# Patient Record
Sex: MALE | Race: WHITE
[De-identification: names, ages, dates, MRNs, and addresses within clinical notes are randomized per-mention and may not be internally consistent; named-entity substitution may affect disease eponyms.]

---

## 2017-04-07 ENCOUNTER — HOSPITAL ENCOUNTER (OUTPATIENT)
Dept: HOSPITAL 45 - C.LAB1850 | Age: 52
Discharge: HOME | End: 2017-04-07
Attending: FAMILY MEDICINE
Payer: COMMERCIAL

## 2017-04-07 DIAGNOSIS — E55.9: ICD-10-CM

## 2017-04-07 DIAGNOSIS — R73.01: Primary | ICD-10-CM

## 2017-04-07 LAB — EST. AVERAGE GLUCOSE BLD GHB EST-MCNC: 120 MG/DL

## 2017-07-09 ENCOUNTER — HOSPITAL ENCOUNTER (EMERGENCY)
Dept: HOSPITAL 45 - C.EDB | Age: 52
Discharge: HOME | End: 2017-07-09
Payer: COMMERCIAL

## 2017-07-09 VITALS
BODY MASS INDEX: 42.66 KG/M2 | WEIGHT: 315 LBS | HEIGHT: 72.01 IN | HEIGHT: 72.01 IN | BODY MASS INDEX: 42.66 KG/M2 | WEIGHT: 315 LBS

## 2017-07-09 VITALS — SYSTOLIC BLOOD PRESSURE: 148 MMHG | HEART RATE: 78 BPM | OXYGEN SATURATION: 97 % | DIASTOLIC BLOOD PRESSURE: 82 MMHG

## 2017-07-09 VITALS — TEMPERATURE: 98.06 F

## 2017-07-09 DIAGNOSIS — E78.5: ICD-10-CM

## 2017-07-09 DIAGNOSIS — G47.33: ICD-10-CM

## 2017-07-09 DIAGNOSIS — E66.01: ICD-10-CM

## 2017-07-09 DIAGNOSIS — K21.9: ICD-10-CM

## 2017-07-09 DIAGNOSIS — L29.9: Primary | ICD-10-CM

## 2017-07-09 NOTE — EMERGENCY ROOM VISIT NOTE
ED Visit Note


First contact with patient:  17:29


CHIEF COMPLAINT: Itchy skin rash since yesterday





HISTORY OF PRESENT ILLNESS:  Patient is a 51-year-old white male who presents 

to the emergency department for evaluation of an itchy skin rash on his legs 

that he noticed yesterday.  His granddaughter was getting , and he could 

not do anything about it yesterday.  He notes a few small, red, slightly raised 

lesions on his legs.  He reports that they're very itchy.  He applied calamine 

lotion to the area.  It has not helped.  He denies any lesions elsewhere.  The 

patient has been outside in the yard or the woods recently where there could 

have been exposure to poison ivy plants.  





REVIEW OF SYSTEMS: Review of systems as per HPI.  All other systems reviewed 

were negative.  At least 6 systems reviewed.





PMH:  Electronic medical records are reviewed and summarized as above/below.  

See Problem List.





SOCIAL HISTORY:  Patient lives at home with his wife.    





PHYSICAL EXAM: Vital Signs: See nurses' notes.  CONSTITUTIONAL: Patient is an 

obese 51-year-old white female awake and alert and in no acute distress.  

INTEGUMENTARY: The patient has a few small scattered lesions noted on his legs.

  They are slightly red, raised and some appear vesicular in nature, others are 

excoriated over.  He has a lesion on the thighs bilaterally, and behind the 

left knee.  


   


ED course: Patient was medicated with Decadron 10 mg and Vistaril 50 mg IM.  

Lesions appear consistent with a contact dermatitis, possibly a rhus 

dermatitis.  Differential diagnoses also entertained included urticaria, 

folliculitis, scabies, among others.  The patient will be placed on a course of 

oral prednisone.  He was advised to follow-up with his primary care provider if 

his symptoms are not improving.


Problem List


Medical Problems:


(1) Esophageal Reflux


Status: Chronic  





(2) Hand laceration


Status: Resolved  





(3) Hand laceration


Status: Resolved  





(4) Hyperlipidemia Nec/Nos


Status: Chronic  





(5) Knee sprain


Status: Resolved  





(6) Morbid Obesity


Status: Chronic  





(7) Obstructive Sleep Apnea (Adult) (Pediatric)


Status: Chronic  





(8) Patellar dislocation


Status: Resolved  





(9) Work related injury


Status: Resolved  





Surgical Problems:


(1) H/O resection of large bowel


Status: Resolved  











Current/Historical Medications


Scheduled


Ergocalciferol (Vitamin D 68816 Unit), 50,000 UNIT PO Q2D


Pantoprazole (Protonix), 40 MG PO QAM


Prednisone (Prednisone), 0 PO DAILY





Scheduled PRN


Hydroxyzine Pamoate (Vistaril), 1-2 CAP PO Q6 PRN for Itching





Allergies


Coded Allergies:  


     Latex2 -Systemic Allergic Response (Verified  Allergy, Severe, HIVES, 7/9/ 17)


     Atorvastatin (Verified  Allergy, Intermediate, "FELT LIKE LUMP IN STOMACH-

PAINFUL", 7/9/17)





Vital Signs











  Date Time  Temp Pulse Resp B/P (MAP) Pulse Ox O2 Delivery O2 Flow Rate FiO2


 


7/9/17 18:32  78 20 148/82 97   


 


7/9/17 17:01 36.7 81 18 151/83 94 Room Air  











Medications Administered











 Medications


  (Trade)  Dose


 Ordered  Sig/Rock


 Route  Start Time


 Stop Time Status Last Admin


Dose Admin


 


 Dexamethasone


 Sodium Phosphate


  (Decadron Inj)  10 mg  NOW  ONCE


 IM  7/9/17 18:00


 7/9/17 18:01 DC 7/9/17 18:24


10 MG


 


 Hydroxyzine HCl


  (Vistaril IM)  50 mg  NOW  STAT


 IM  7/9/17 17:57


 7/9/17 17:58 DC 7/9/17 18:24


50 MG











Departure Information


Impression





 Primary Impression:  


 Pruritic rash





Prescriptions





Hydroxyzine Pamoate (VISTARIL) 25 Mg Cap


1-2 CAP PO Q6 Y for Itching, #30 CAP


   Prov: Monica Delatorre PA         7/9/17 


Prednisone (Prednisone) 20 Mg Tab


0 PO DAILY, #30 TAB


   3 DAILY FOR 5 DAYS, THEN 2 DAILY FOR 5 DAYS, THEN 1 DAILY FOR 5 DAYS.


   Prov: Monica Delatorre PA         7/9/17





Referrals


Matias Ogden M.D. (PCP)





Patient Instructions


My [facility]





Additional Instructions





DO NOT drive, drink alcohol, operate machinery, or perform dangerous activities 

today.  You were given medications in the ER that can affect your ability to 

safely function or operate a vehicle.





Prednisone 20mg:  Once daily as instructed until the prescription is finished.  

It is best to take this earlier in the day as some patients note occasional 

difficulty falling asleep when taken in the late evening.





Hydroxyzine 25mg:  use 25 to 50 mg as needed every six hours for swelling, 

itching, or hives.  This medication is sedating and will cause drowsiness. 

Avoid alcohol, operating machinery or dangerous equipment, working on ladders 

or roofs, DRIVING, or situations where being under the influence may be 

dangerous.





Zantac 75: Take two pills twice a day along with Benadryl as needed for swelling

, itching, or hives. Most people know this for its affect on the stomach, but 

it also acts similar to, but less potent than Benadryl for allergic reactions.  





Zantac is available over-the-counter.





Continue current medications.





Return to the emergency department for worsening of your rash, swelling of your 

face, lips, tongue, or throat, difficulty breathing, vomiting, or as needed.


 


Follow-up with your primary care physician in 2-3 days for a recheck of your 

current condition.

## 2017-08-25 ENCOUNTER — HOSPITAL ENCOUNTER (OUTPATIENT)
Dept: HOSPITAL 45 - C.LAB1850 | Age: 52
Discharge: HOME | End: 2017-08-25
Attending: FAMILY MEDICINE
Payer: COMMERCIAL

## 2017-08-25 DIAGNOSIS — E55.9: ICD-10-CM

## 2017-08-25 DIAGNOSIS — R73.03: Primary | ICD-10-CM

## 2017-08-25 LAB
ALBUMIN/GLOB SERPL: 0.9 {RATIO} (ref 0.9–2)
ALP SERPL-CCNC: 90 U/L (ref 45–117)
ALT SERPL-CCNC: 38 U/L (ref 12–78)
ANION GAP SERPL CALC-SCNC: 9 MMOL/L (ref 3–11)
AST SERPL-CCNC: 20 U/L (ref 15–37)
BUN SERPL-MCNC: 15 MG/DL (ref 7–18)
BUN/CREAT SERPL: 15.4 (ref 10–20)
CALCIUM SERPL-MCNC: 9.1 MG/DL (ref 8.5–10.1)
CHLORIDE SERPL-SCNC: 104 MMOL/L (ref 98–107)
CHOLEST/HDLC SERPL: 4.8 {RATIO}
CO2 SERPL-SCNC: 25 MMOL/L (ref 21–32)
CREAT SERPL-MCNC: 0.97 MG/DL (ref 0.6–1.4)
GLOBULIN SER-MCNC: 4.2 GM/DL (ref 2.5–4)
GLUCOSE SERPL-MCNC: 79 MG/DL (ref 70–99)
GLUCOSE UR QL: 34 MG/DL
KETONES UR QL STRIP: 104 MG/DL
NITRITE UR QL STRIP: 128 MG/DL (ref 0–150)
PH UR: 164 MG/DL (ref 0–200)
POTASSIUM SERPL-SCNC: 3.6 MMOL/L (ref 3.5–5.1)
SODIUM SERPL-SCNC: 138 MMOL/L (ref 136–145)
TSH SERPL-ACNC: 0.96 UIU/ML (ref 0.3–4.5)
VERY LOW DENSITY LIPOPROT CALC: 26 MG/DL

## 2017-08-26 LAB — EST. AVERAGE GLUCOSE BLD GHB EST-MCNC: 105 MG/DL

## 2017-10-22 ENCOUNTER — HOSPITAL ENCOUNTER (EMERGENCY)
Dept: HOSPITAL 45 - C.EDB | Age: 52
Discharge: HOME | End: 2017-10-22
Payer: COMMERCIAL

## 2017-10-22 VITALS — OXYGEN SATURATION: 95 % | SYSTOLIC BLOOD PRESSURE: 127 MMHG | HEART RATE: 72 BPM | DIASTOLIC BLOOD PRESSURE: 57 MMHG

## 2017-10-22 VITALS
HEIGHT: 72.01 IN | BODY MASS INDEX: 42.66 KG/M2 | HEIGHT: 72.01 IN | WEIGHT: 315 LBS | BODY MASS INDEX: 42.66 KG/M2 | WEIGHT: 315 LBS

## 2017-10-22 VITALS — TEMPERATURE: 97.88 F

## 2017-10-22 DIAGNOSIS — J20.9: Primary | ICD-10-CM

## 2017-10-22 DIAGNOSIS — Z79.899: ICD-10-CM

## 2017-10-22 DIAGNOSIS — Z87.01: ICD-10-CM

## 2017-10-22 DIAGNOSIS — F17.220: ICD-10-CM

## 2017-10-22 DIAGNOSIS — R73.03: ICD-10-CM

## 2017-10-22 DIAGNOSIS — Z90.49: ICD-10-CM

## 2017-10-22 LAB
ALBUMIN/GLOB SERPL: 0.8 {RATIO} (ref 0.9–2)
ALP SERPL-CCNC: 94 U/L (ref 45–117)
ALT SERPL-CCNC: 35 U/L (ref 12–78)
ANION GAP SERPL CALC-SCNC: 5 MMOL/L (ref 3–11)
AST SERPL-CCNC: 19 U/L (ref 15–37)
BASOPHILS # BLD: 0.02 K/UL (ref 0–0.2)
BASOPHILS NFR BLD: 0.2 %
BUN SERPL-MCNC: 16 MG/DL (ref 7–18)
BUN/CREAT SERPL: 17.1 (ref 10–20)
CALCIUM SERPL-MCNC: 8.8 MG/DL (ref 8.5–10.1)
CHLORIDE SERPL-SCNC: 106 MMOL/L (ref 98–107)
CO2 SERPL-SCNC: 27 MMOL/L (ref 21–32)
COMPLETE: YES
CREAT CL PREDICTED SERPL C-G-VRATE: 140.6 ML/MIN
CREAT SERPL-MCNC: 0.93 MG/DL (ref 0.6–1.4)
EOSINOPHIL NFR BLD AUTO: 267 K/UL (ref 130–400)
GLOBULIN SER-MCNC: 4.1 GM/DL (ref 2.5–4)
GLUCOSE SERPL-MCNC: 104 MG/DL (ref 70–99)
HCT VFR BLD CALC: 41.6 % (ref 42–52)
IG%: 0.7 %
IMM GRANULOCYTES NFR BLD AUTO: 20 %
LYMPHOCYTES # BLD: 1.75 K/UL (ref 1.2–3.4)
MCH RBC QN AUTO: 29.1 PG (ref 25–34)
MCHC RBC AUTO-ENTMCNC: 34.4 G/DL (ref 32–36)
MCV RBC AUTO: 84.7 FL (ref 80–100)
MONOCYTES NFR BLD: 8.2 %
NEUTROPHILS # BLD AUTO: 2.4 %
NEUTROPHILS NFR BLD AUTO: 68.5 %
PMV BLD AUTO: 9.6 FL (ref 7.4–10.4)
POTASSIUM SERPL-SCNC: 3.8 MMOL/L (ref 3.5–5.1)
RBC # BLD AUTO: 4.91 M/UL (ref 4.7–6.1)
SODIUM SERPL-SCNC: 138 MMOL/L (ref 136–145)
WBC # BLD AUTO: 8.76 K/UL (ref 4.8–10.8)

## 2017-10-22 NOTE — DIAGNOSTIC IMAGING REPORT
CHEST 2 VIEWS ROUTINE



CLINICAL HISTORY: Respiratory distress    



COMPARISON STUDY:  3/6/2015



FINDINGS: The heart is at the upper limits of normal in size. There is no

failure. There is no focal pulmonary consolidation. There are no pleural

effusions.[ 



IMPRESSION: No active disease in the chest.







Electronically signed by:  Christopher Beckham M.D.

10/22/2017 1:39 PM



Dictated Date/Time:  10/22/2017 1:38 PM

## 2017-10-23 NOTE — EMERGENCY ROOM VISIT NOTE
ED Visit Note


First contact with patient:  11:50


Chief Complaint: I think have a chest cold.





History of Present Illness: Mr. Saldivar is a 52-year-old white male who 

ambulates into the ED accompanied by his wife complaining of cough and, rib 

pain.


Historically patient does report he has had a history of bronchitis and 

pneumonia.


Patient reports her symptoms started approximately 2 weeks ago with a mild 

nonproductive cough.  During the last 2 weeks he reports he has gradually 

increasing cough.  This cough remains nonproductive.  He is tried over-the-

counter cough medications without relief.  He reports last 2 days his cough has 

become so severe that he cannot lie down and he has lost sleep.


Associated with this cough he reports he has having bilateral lateral rib pain.

  He describes this pain as a constant dull achy sensation that becomes sharp 

with cough.  He rates his discomfort 7/10.  His pain is nonradiating.  He has 

not identified any other aggravating factors related to the pain.  He has not 

taken any medications for his discomfort prior to arrival at the hospital.  He 

denies any other associated symptoms with his cough and pain including fevers, 

chills, sweats, skin eruptions, skin color changes, previous clots, claudication

, palpitations, shortness of breath, wheezing, anterior chest pain, recent 

surgery, nausea, vomiting, orthopnea, dependent edema.





Review of Systems: As noted above in history of present illness.  All body 

systems were reviewed and found to be negative as noted above.





Past Medical History: Prediabetes, status post hernia repair, unspecified knee 

surgery and partial colectomy.


Current Medications: Protonix, vitamin D.


Allergies to Medications: Latex, atorvastatin.


Social History: Patient is currently employed; he lives with his wife and feels 

safe in his home environment; he admits to chewing tobacco and denies alcohol 

use.





Physical Examination:


Vital Signs: 








  Date Time  Temp Pulse Resp B/P (MAP) Pulse Ox O2 Delivery O2 Flow Rate FiO2


 


10/22/17 13:16  72 18 127/57 95 Room Air  


 


10/22/17 12:56  72      


 


10/22/17 12:38     95 Room Air  


 


10/22/17 11:49     96 Room Air  


 


10/22/17 11:45 36.6 78 20 130/75 97 Room Air  





GENERAL: 52-year-old male in mild distress due to symptoms, nontoxic-appearing, 

afebrile and hemodynamically stable.


NEUROLOGICAL: Awake, alert and oriented to person, place and time.  Answering 

questions appropriately and following commands.  Normal gait.  Good hand eye 

coordination.  No focal motor sensory deficits.


SKIN: Warm, dry and pink.  No soft tissue eruptions or trauma noted.


HEENT:  Atraumatic and normocephalic.  PERRLA.  Sclera white and conjunctiva 

pink.  No drainage from naris.  Oral cavity moist and pink.  Pharynx is 

nonerythematous or edematous.  Speech normal.  No lymphadenopathy.  Trachea 

midline.  No jugular venous distention.


BACK:  No tenderness over the bony spine.  No CVA tenderness.  


THORAX:  Lungs sounds show a few coarse rhonchi in the right base that clears 

with cough.  There is a decrease in air movement bilaterally primarily in the 

bases.  No wheezing, rales.  Mild tenderness with chest compression without 

crepitus, subcutaneous air or deformities noted.  No increased respiratory 

effort or rate.


HEART:  Regular rate and rhythm.  No gallops, rubs or murmurs are appreciated.


ABDOMEN: Obese, soft and nontender.  Positive bowel sounds in all quadrants.  

No guarding, rigidity or organomegaly.


EXTREMITIES:  Moves all extremities well on command and with purpose.  All 

distal neurovascular statuses are intact and equal bilaterally.  No calf 

tenderness or cords.





ED Course:


Patient is assessed as noted above.


Patient's medication list was reviewed.


Laboratory Testing:








Test


  10/22/17


12:50 10/22/17


13:05 Range/Units


 


 


White Blood Count 8.76  4.8-10.8  K/uL


 


Red Blood Count 4.91  4.7-6.1  M/uL


 


Hemoglobin 14.3  14.0-18.0  g/dL


 


Hematocrit 41.6  42-52  %


 


Mean Corpuscular Volume 84.7    fL


 


Mean Corpuscular Hemoglobin 29.1  25-34  pg


 


Mean Corpuscular Hemoglobin


Concent 34.4


  


  32-36  g/dl


 


 


Platelet Count 267  130-400  K/uL


 


Mean Platelet Volume 9.6  7.4-10.4  fL


 


Neutrophils (%) (Auto) 68.5   %


 


Lymphocytes (%) (Auto) 20.0   %


 


Monocytes (%) (Auto) 8.2   %


 


Eosinophils (%) (Auto) 2.4   %


 


Basophils (%) (Auto) 0.2   %


 


Neutrophils # (Auto) 6.00  1.4-6.5  K/uL


 


Lymphocytes # (Auto) 1.75  1.2-3.4  K/uL


 


Monocytes # (Auto) 0.72  0.11-0.59  K/uL


 


Eosinophils # (Auto) 0.21  0-0.5  K/uL


 


Basophils # (Auto) 0.02  0-0.2  K/uL


 


RDW Standard Deviation 44.9  36.4-46.3  fL


 


RDW Coefficient of Variation 14.6  11.5-14.5  %


 


Immature Granulocyte % (Auto) 0.7   %


 


Immature Granulocyte # (Auto) 0.06  0.00-0.02  K/uL


 


Sodium Level 138  136-145  mmol/L


 


Potassium Level 3.8  3.5-5.1  mmol/L


 


Chloride Level 106    mmol/L


 


Carbon Dioxide Level 27  21-32  mmol/L


 


Anion Gap 5.0  3-11  mmol/L


 


Blood Urea Nitrogen 16  7-18  mg/dl


 


Creatinine


  0.93


  


  0.60-1.40


mg/dl


 


Est Creatinine Clear Calc


Drug Dose 140.6


  


   ml/min


 


 


Estimated GFR (


American) 109.8


  


   


 


 


Estimated GFR (Non-


American 94.7


  


   


 


 


BUN/Creatinine Ratio 17.1  10-20  


 


Random Glucose 104  70-99  mg/dl


 


Calcium Level 8.8  8.5-10.1  mg/dl


 


Total Bilirubin 0.4  0.2-1  mg/dl


 


Aspartate Amino Transf


(AST/SGOT) 19


  


  15-37  U/L


 


 


Alanine Aminotransferase


(ALT/SGPT) 35


  


  12-78  U/L


 


 


Alkaline Phosphatase 94    U/L


 


Total Protein 7.5  6.4-8.2  gm/dl


 


Albumin 3.4  3.4-5.0  gm/dl


 


Globulin 4.1  2.5-4.0  gm/dl


 


Albumin/Globulin Ratio 0.8  0.9-2  


 


Bedside Troponin I  < 0.030 0-0.045  ng/ml





Chest X-Rays: Were read by myself and the radiologist showing no acute 

infiltrates, effusions or pneumothorax.  Radiologist notes that the heart 

border is within upper limits.  No signs of heart failure.


EKG: Was read by myself and reviewed with ; shows normal sinus 

rhythm with a ventricular rate of 76 bpm.  Normal axis, intervals and 

complexes.  No acute ST changes indicating ischemia, injury or infarction.  

This was compared to previous from March 2015 in no acute changes were noted.


Patient was hydrated with normal saline, he was given an albuterol/Atrovent 

nebulizer breathing treatment, 125 mg of Solu-Medrol IV and 10 mL of Hycodan 

cough syrup.


Patient was reassessed multiple times during his stay in the emergency 

department; reevaluation of his lungs he reports he was feeling much better and 

he had improved air movement and resolution of all scattered rhonchi.


Patient's case was reviewed with ; we agreed on diagnostic approach

, treatment, disposition and plan.


Patient was educated about today's findings and instructed on his treatment plan

; he verbalized understanding and agreement with this plan.





Clinical Impression: Acute bronchitis.





Decision-Making: Initially my differential diagnosis I considered bronchitis, 

pneumonia, pulmonary embolism, acute coronary syndrome, thoracic aneurysm, 

pneumothorax, costochondritis and other causes.





Disposition: Patient discharged home in stable condition accompanied by his wife

; prior to departure he was reassessed and subjectively reported he was still 

feeling much better and reported he had resolution of pain.





Plan:


Patient was prescribed a Zithromax for antibiotic coverage, placed on a 5 day 

course of steroids and was prescribed albuterol inhaler with spacer and 

instructed on their use.


Patient was encouraged to follow-up with family physician for recheck.


Patient is encouraged return the ED for worsening symptoms, fevers, coughing up 

blood, anterior chest pain or any new/concerning symptoms.

## 2018-07-27 ENCOUNTER — HOSPITAL ENCOUNTER (EMERGENCY)
Dept: HOSPITAL 45 - C.EDB | Age: 53
Discharge: HOME | End: 2018-07-27
Payer: COMMERCIAL

## 2018-07-27 VITALS — OXYGEN SATURATION: 96 % | DIASTOLIC BLOOD PRESSURE: 80 MMHG | HEART RATE: 87 BPM | SYSTOLIC BLOOD PRESSURE: 134 MMHG

## 2018-07-27 VITALS
WEIGHT: 315 LBS | BODY MASS INDEX: 42.66 KG/M2 | BODY MASS INDEX: 42.66 KG/M2 | HEIGHT: 72.01 IN | HEIGHT: 72.01 IN | WEIGHT: 315 LBS

## 2018-07-27 VITALS — TEMPERATURE: 98.24 F

## 2018-07-27 DIAGNOSIS — E66.01: ICD-10-CM

## 2018-07-27 DIAGNOSIS — K64.8: Primary | ICD-10-CM

## 2018-07-27 DIAGNOSIS — K21.9: ICD-10-CM

## 2018-07-27 DIAGNOSIS — Z90.49: ICD-10-CM

## 2018-07-27 NOTE — EMERGENCY ROOM VISIT NOTE
History


First contact with patient:  17:57


Chief Complaint:  RECTAL BLEEDING


Stated Complaint:  BLEEDING HEMROIDS





History of Present Illness


The patient is a 52 year old male who presents to the Emergency Room with 

complaints of intermittent rectal bleeding for the last 2 months.  The patient 

states that it is bleeding hemorrhoids.  He only bleeds intermittently when he 

has a bowel movement.  He states it is gotten worse over the last 2 months.  He 

states that now hurts to have a bowel movement.  He does not have a problem 

with constipation.  He normally does not have to strain to have a bowel 

movement.  He was told 2 years ago when he had a colonoscopy that he has 

internal hemorrhoids.  The patient called his family doctor today and he was 

instructed to come to the emergency room.  The patient has never seen a surgeon 

for hemorrhoids.  The patient denies any abdominal pain, nausea or vomiting.  

The patient is not on any blood thinners.  He does not have any bleeding 

disorders.





Review of Systems


10 system review was performed and was negative unless stated otherwise history 

of present illness.





Past Medical/Surgical History


Medical Problems:


(1) Esophageal Reflux


(2) Hand laceration


(3) Hand laceration


(4) Hyperlipidemia Nec/Nos


(5) Knee sprain


(6) Morbid Obesity


(7) Obstructive Sleep Apnea (Adult) (Pediatric)


(8) Patellar dislocation


(9) Work related injury


Surgical Problems:


(1) H/O resection of large bowel








Social History


Smoking Status:  Never Smoker


Occupation Status:  employed





Current/Historical Medications


Scheduled


Ergocalciferol (Vitamin D 89736 Unit), 50,000 UNIT PO Q2D


Pantoprazole (Protonix), 40 MG PO QAM





Physical Exam


Vital Signs











  Date Time  Temp Pulse Resp B/P (MAP) Pulse Ox O2 Delivery O2 Flow Rate FiO2


 


7/27/18 17:03 36.8 91 17 174/83 94 Room Air  











Physical Exam


GENERAL: Obese 52-year-old male appears in no acute distress.


MENTAL Status: Alert and oriented 3.


LUNGS: Clear auscultation without wheezes rales or rhonchi.


CARDIAC: Regular rate and rhythm with occasional ectopy.  Pulses is full and 

equal throughout.


ABDOMEN: Positive bowel sounds all 4 quadrants.  Soft, nontender to palpation .

  Cannot evaluate for organomegaly or masses secondary to patient's size.


RECTAL: No external masses noted.  Anal sphincter tone intact.  Rectal exam was 

too painful therefore he did not do complete internal rectal exam.  I do not 

appreciate any palpable masses.  Stool guaiac was negative





Medical Decision & Procedures


ED Course


The patient was evaluated.  The patient's EMR medication list were reviewed.  

The patient was informed of treatment plan and was in agreement.  The patient 

was discharged home in stable condition.





Medical Decision


Differential diagnosis include internal versus external thrombosed hemorrhoids.

  His hemorrhoids are internal the patient will be referred to surgery but in 

the interim will prescribe him Anusol HC suppositories.





[place] Drug Monitoring Program


Search Results:  patient reviewed within database





Medication Reconcilliation


Current Medication List:  was personally reviewed by me





Blood Pressure Screening


Patient's blood pressure:  Elevated blood pressure


Blood pressure disposition:  Elevated BP felt to be situational





Impression





 Primary Impression:  


 Internal bleeding hemorrhoids





Departure Information


Dispostion


Home / Self-Care





Condition


GOOD





Prescriptions





Hydrocortisone Acetate (Rectal (ANUSOL-HC) 25 Mg Sup


25 MG DE BID for 5 Days, #10 SUPP


   Prov: Aye Olsen PA-C         7/27/18





Referrals


Matias Ogden M.D. (PCP)








Anson Arreola M.D.





Forms


HOME CARE DOCUMENTATION FORM,                                                 

               IMPORTANT VISIT INFORMATION, WORK / SCHOOL INSTRUCTIONS





Patient Instructions


ED Hemorrhoids, My [facility]





Additional Instructions





Try not to strain for a bowel movement.  Recommend a stool softener daily such 

as MiraLAX or Colace.  Use Anusol suppositories as directed.  Call Dr. Arreola for follow-up appointment for definitive treatment.